# Patient Record
Sex: MALE | Race: OTHER | HISPANIC OR LATINO | URBAN - METROPOLITAN AREA
[De-identification: names, ages, dates, MRNs, and addresses within clinical notes are randomized per-mention and may not be internally consistent; named-entity substitution may affect disease eponyms.]

---

## 2017-04-29 ENCOUNTER — INPATIENT (INPATIENT)
Facility: HOSPITAL | Age: 57
LOS: 4 days | Discharge: HOME | DRG: 897 | End: 2017-05-04
Attending: INTERNAL MEDICINE | Admitting: INTERNAL MEDICINE
Payer: SELF-PAY

## 2017-04-29 VITALS
SYSTOLIC BLOOD PRESSURE: 178 MMHG | WEIGHT: 149.91 LBS | HEIGHT: 67 IN | OXYGEN SATURATION: 96 % | DIASTOLIC BLOOD PRESSURE: 107 MMHG | RESPIRATION RATE: 20 BRPM | HEART RATE: 81 BPM | TEMPERATURE: 99 F

## 2017-04-29 DIAGNOSIS — Z29.9 ENCOUNTER FOR PROPHYLACTIC MEASURES, UNSPECIFIED: ICD-10-CM

## 2017-04-29 DIAGNOSIS — F19.10 OTHER PSYCHOACTIVE SUBSTANCE ABUSE, UNCOMPLICATED: ICD-10-CM
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
## 2017-04-29 DIAGNOSIS — R94.5 ABNORMAL RESULTS OF LIVER FUNCTION STUDIES: ICD-10-CM

## 2017-04-29 DIAGNOSIS — E87.8 OTHER DISORDERS OF ELECTROLYTE AND FLUID BALANCE, NOT ELSEWHERE CLASSIFIED: ICD-10-CM

## 2017-04-29 DIAGNOSIS — F10.239 ALCOHOL DEPENDENCE WITH WITHDRAWAL, UNSPECIFIED: ICD-10-CM

## 2017-04-29 LAB
ACETONE SERPL-MCNC: ABNORMAL
ALBUMIN SERPL ELPH-MCNC: 3.6 G/DL — SIGNIFICANT CHANGE UP (ref 3.5–5)
ALP SERPL-CCNC: 79 U/L — SIGNIFICANT CHANGE UP (ref 40–120)
ALT FLD-CCNC: 181 U/L DA — HIGH (ref 10–60)
AMYLASE P1 CFR SERPL: 149 U/L — HIGH (ref 25–115)
ANION GAP SERPL CALC-SCNC: 15 MMOL/L — SIGNIFICANT CHANGE UP (ref 5–17)
APPEARANCE UR: CLEAR — SIGNIFICANT CHANGE UP
APTT BLD: 26.9 SEC — LOW (ref 27.5–37.4)
AST SERPL-CCNC: 317 U/L — HIGH (ref 10–40)
BASOPHILS # BLD AUTO: 0.1 K/UL — SIGNIFICANT CHANGE UP (ref 0–0.2)
BASOPHILS NFR BLD AUTO: 2.1 % — HIGH (ref 0–2)
BILIRUB SERPL-MCNC: 1 MG/DL — SIGNIFICANT CHANGE UP (ref 0.2–1.2)
BILIRUB UR-MCNC: NEGATIVE — SIGNIFICANT CHANGE UP
BUN SERPL-MCNC: 10 MG/DL — SIGNIFICANT CHANGE UP (ref 7–18)
CALCIUM SERPL-MCNC: 9.5 MG/DL — SIGNIFICANT CHANGE UP (ref 8.4–10.5)
CHLORIDE SERPL-SCNC: 99 MMOL/L — SIGNIFICANT CHANGE UP (ref 96–108)
CK MB BLD-MCNC: 0.3 % — SIGNIFICANT CHANGE UP (ref 0–3.5)
CK MB BLD-MCNC: 0.3 % — SIGNIFICANT CHANGE UP (ref 0–3.5)
CK MB CFR SERPL CALC: 2.9 NG/ML — SIGNIFICANT CHANGE UP (ref 0–3.6)
CK MB CFR SERPL CALC: 3.7 NG/ML — HIGH (ref 0–3.6)
CK SERPL-CCNC: 1331 U/L — HIGH (ref 35–232)
CK SERPL-CCNC: 943 U/L — HIGH (ref 35–232)
CO2 SERPL-SCNC: 25 MMOL/L — SIGNIFICANT CHANGE UP (ref 22–31)
COLOR SPEC: YELLOW — SIGNIFICANT CHANGE UP
CREAT SERPL-MCNC: 0.83 MG/DL — SIGNIFICANT CHANGE UP (ref 0.5–1.3)
DIFF PNL FLD: ABNORMAL
EOSINOPHIL # BLD AUTO: 0 K/UL — SIGNIFICANT CHANGE UP (ref 0–0.5)
EOSINOPHIL NFR BLD AUTO: 0 % — SIGNIFICANT CHANGE UP (ref 0–6)
ETHANOL SERPL-MCNC: <3 MG/DL — SIGNIFICANT CHANGE UP (ref 0–10)
GLUCOSE SERPL-MCNC: 127 MG/DL — HIGH (ref 70–99)
GLUCOSE UR QL: NEGATIVE — SIGNIFICANT CHANGE UP
HCT VFR BLD CALC: 43.7 % — SIGNIFICANT CHANGE UP (ref 39–50)
HGB BLD-MCNC: 14.1 G/DL — SIGNIFICANT CHANGE UP (ref 13–17)
INR BLD: 1.03 RATIO — SIGNIFICANT CHANGE UP (ref 0.88–1.16)
KETONES UR-MCNC: ABNORMAL
LACTATE SERPL-SCNC: 1.6 MMOL/L — SIGNIFICANT CHANGE UP (ref 0.7–2)
LEUKOCYTE ESTERASE UR-ACNC: NEGATIVE — SIGNIFICANT CHANGE UP
LIDOCAIN IGE QN: 403 U/L — HIGH (ref 73–393)
LYMPHOCYTES # BLD AUTO: 0.6 K/UL — LOW (ref 1–3.3)
LYMPHOCYTES # BLD AUTO: 13.3 % — SIGNIFICANT CHANGE UP (ref 13–44)
MAGNESIUM SERPL-MCNC: 2.1 MG/DL — SIGNIFICANT CHANGE UP (ref 1.8–2.4)
MCHC RBC-ENTMCNC: 32 PG — SIGNIFICANT CHANGE UP (ref 27–34)
MCHC RBC-ENTMCNC: 32.2 GM/DL — SIGNIFICANT CHANGE UP (ref 32–36)
MCV RBC AUTO: 99.2 FL — SIGNIFICANT CHANGE UP (ref 80–100)
MONOCYTES # BLD AUTO: 0.6 K/UL — SIGNIFICANT CHANGE UP (ref 0–0.9)
MONOCYTES NFR BLD AUTO: 13.4 % — SIGNIFICANT CHANGE UP (ref 2–14)
NEUTROPHILS # BLD AUTO: 3.3 K/UL — SIGNIFICANT CHANGE UP (ref 1.8–7.4)
NEUTROPHILS NFR BLD AUTO: 71.2 % — SIGNIFICANT CHANGE UP (ref 43–77)
NITRITE UR-MCNC: NEGATIVE — SIGNIFICANT CHANGE UP
NT-PROBNP SERPL-SCNC: 209 PG/ML — HIGH (ref 0–125)
PCP SPEC-MCNC: SIGNIFICANT CHANGE UP
PH UR: 7 — SIGNIFICANT CHANGE UP (ref 5–8)
PLATELET # BLD AUTO: 100 K/UL — LOW (ref 150–400)
POTASSIUM SERPL-MCNC: 3.9 MMOL/L — SIGNIFICANT CHANGE UP (ref 3.5–5.3)
POTASSIUM SERPL-SCNC: 3.9 MMOL/L — SIGNIFICANT CHANGE UP (ref 3.5–5.3)
PROT SERPL-MCNC: 8.3 G/DL — SIGNIFICANT CHANGE UP (ref 6–8.3)
PROT UR-MCNC: 30 MG/DL
PROTHROM AB SERPL-ACNC: 11.2 SEC — SIGNIFICANT CHANGE UP (ref 9.8–12.7)
RBC # BLD: 4.4 M/UL — SIGNIFICANT CHANGE UP (ref 4.2–5.8)
RBC # FLD: 14.2 % — SIGNIFICANT CHANGE UP (ref 10.3–14.5)
SODIUM SERPL-SCNC: 139 MMOL/L — SIGNIFICANT CHANGE UP (ref 135–145)
SP GR SPEC: 1.01 — SIGNIFICANT CHANGE UP (ref 1.01–1.02)
TROPONIN I SERPL-MCNC: 0.02 NG/ML — SIGNIFICANT CHANGE UP (ref 0–0.04)
TROPONIN I SERPL-MCNC: 0.03 NG/ML — SIGNIFICANT CHANGE UP (ref 0–0.04)
UROBILINOGEN FLD QL: 1
WBC # BLD: 4.6 K/UL — SIGNIFICANT CHANGE UP (ref 3.8–10.5)
WBC # FLD AUTO: 4.6 K/UL — SIGNIFICANT CHANGE UP (ref 3.8–10.5)

## 2017-04-29 PROCEDURE — 72100 X-RAY EXAM L-S SPINE 2/3 VWS: CPT | Mod: 26

## 2017-04-29 PROCEDURE — 71010: CPT | Mod: 26

## 2017-04-29 PROCEDURE — 99291 CRITICAL CARE FIRST HOUR: CPT

## 2017-04-29 RX ORDER — ONDANSETRON 8 MG/1
4 TABLET, FILM COATED ORAL ONCE
Qty: 0 | Refills: 0 | Status: COMPLETED | OUTPATIENT
Start: 2017-04-29 | End: 2017-04-29

## 2017-04-29 RX ORDER — PANTOPRAZOLE SODIUM 20 MG/1
40 TABLET, DELAYED RELEASE ORAL
Qty: 0 | Refills: 0 | Status: DISCONTINUED | OUTPATIENT
Start: 2017-04-29 | End: 2017-05-04

## 2017-04-29 RX ORDER — PANTOPRAZOLE SODIUM 20 MG/1
40 TABLET, DELAYED RELEASE ORAL ONCE
Qty: 0 | Refills: 0 | Status: COMPLETED | OUTPATIENT
Start: 2017-04-29 | End: 2017-04-29

## 2017-04-29 RX ORDER — ACETAMINOPHEN 500 MG
650 TABLET ORAL EVERY 6 HOURS
Qty: 0 | Refills: 0 | Status: DISCONTINUED | OUTPATIENT
Start: 2017-04-29 | End: 2017-05-04

## 2017-04-29 RX ORDER — ENOXAPARIN SODIUM 100 MG/ML
40 INJECTION SUBCUTANEOUS DAILY
Qty: 0 | Refills: 0 | Status: DISCONTINUED | OUTPATIENT
Start: 2017-04-29 | End: 2017-05-04

## 2017-04-29 RX ORDER — SODIUM CHLORIDE 9 MG/ML
1000 INJECTION INTRAMUSCULAR; INTRAVENOUS; SUBCUTANEOUS
Qty: 0 | Refills: 0 | Status: DISCONTINUED | OUTPATIENT
Start: 2017-04-29 | End: 2017-04-29

## 2017-04-29 RX ORDER — POTASSIUM PHOSPHATE, MONOBASIC POTASSIUM PHOSPHATE, DIBASIC 236; 224 MG/ML; MG/ML
15 INJECTION, SOLUTION INTRAVENOUS ONCE
Qty: 0 | Refills: 0 | Status: COMPLETED | OUTPATIENT
Start: 2017-04-29 | End: 2017-04-29

## 2017-04-29 RX ORDER — SODIUM CHLORIDE 9 MG/ML
1000 INJECTION, SOLUTION INTRAVENOUS
Qty: 0 | Refills: 0 | Status: DISCONTINUED | OUTPATIENT
Start: 2017-04-29 | End: 2017-05-03

## 2017-04-29 RX ORDER — SODIUM CHLORIDE 9 MG/ML
1000 INJECTION INTRAMUSCULAR; INTRAVENOUS; SUBCUTANEOUS ONCE
Qty: 0 | Refills: 0 | Status: COMPLETED | OUTPATIENT
Start: 2017-04-29 | End: 2017-04-29

## 2017-04-29 RX ORDER — ASPIRIN/CALCIUM CARB/MAGNESIUM 324 MG
162 TABLET ORAL ONCE
Qty: 0 | Refills: 0 | Status: COMPLETED | OUTPATIENT
Start: 2017-04-29 | End: 2017-04-29

## 2017-04-29 RX ORDER — ACETAMINOPHEN 500 MG
650 TABLET ORAL ONCE
Qty: 0 | Refills: 0 | Status: COMPLETED | OUTPATIENT
Start: 2017-04-29 | End: 2017-04-29

## 2017-04-29 RX ORDER — ONDANSETRON 8 MG/1
4 TABLET, FILM COATED ORAL EVERY 4 HOURS
Qty: 0 | Refills: 0 | Status: DISCONTINUED | OUTPATIENT
Start: 2017-04-29 | End: 2017-05-04

## 2017-04-29 RX ADMIN — Medication 2 MILLIGRAM(S): at 20:13

## 2017-04-29 RX ADMIN — Medication 162 MILLIGRAM(S): at 10:32

## 2017-04-29 RX ADMIN — ENOXAPARIN SODIUM 40 MILLIGRAM(S): 100 INJECTION SUBCUTANEOUS at 14:58

## 2017-04-29 RX ADMIN — SODIUM CHLORIDE 200 MILLILITER(S): 9 INJECTION INTRAMUSCULAR; INTRAVENOUS; SUBCUTANEOUS at 08:30

## 2017-04-29 RX ADMIN — SODIUM CHLORIDE 2000 MILLILITER(S): 9 INJECTION INTRAMUSCULAR; INTRAVENOUS; SUBCUTANEOUS at 10:33

## 2017-04-29 RX ADMIN — Medication 650 MILLIGRAM(S): at 17:23

## 2017-04-29 RX ADMIN — Medication 650 MILLIGRAM(S): at 17:55

## 2017-04-29 RX ADMIN — POTASSIUM PHOSPHATE, MONOBASIC POTASSIUM PHOSPHATE, DIBASIC 62.5 MILLIMOLE(S): 236; 224 INJECTION, SOLUTION INTRAVENOUS at 14:52

## 2017-04-29 RX ADMIN — Medication 2 MILLIGRAM(S): at 23:46

## 2017-04-29 RX ADMIN — ONDANSETRON 4 MILLIGRAM(S): 8 TABLET, FILM COATED ORAL at 08:30

## 2017-04-29 RX ADMIN — Medication 2 MILLIGRAM(S): at 08:30

## 2017-04-29 RX ADMIN — PANTOPRAZOLE SODIUM 40 MILLIGRAM(S): 20 TABLET, DELAYED RELEASE ORAL at 08:30

## 2017-04-29 RX ADMIN — SODIUM CHLORIDE 125 MILLILITER(S): 9 INJECTION, SOLUTION INTRAVENOUS at 14:48

## 2017-04-29 RX ADMIN — Medication 650 MILLIGRAM(S): at 10:32

## 2017-04-29 NOTE — ED PROVIDER NOTE - OBJECTIVE STATEMENT
56 y.o. male BIBA pt claims "I stopped drinking 2 days ago", last drank Wed. am, pt claims just wants to stop, c/o vomiting since yest, numerous times, watery stool numerous times, no BRBPR, shaking, unable to sleep, visual/auditory hallucination.  no abd pain, pt fell 2x yest on his back, no head injury, no LOC, c/o back pain

## 2017-04-29 NOTE — ED ADULT TRIAGE NOTE - CHIEF COMPLAINT QUOTE
AS per EMS, Alcohol withdrawal. Noted with марина hand tremors and vomiting. Last drink x 2 days ago.

## 2017-04-29 NOTE — ED PROVIDER NOTE - PROGRESS NOTE DETAILS
pt with significant dehydration, alcohol withdraw, will admit pt with significant dehydration, alcohol withdraw, will admit, d/w Dr. Gar

## 2017-04-29 NOTE — H&P ADULT. - PROBLEM SELECTOR PLAN 6
Improved VTE score of 0, with 3 month risk of VTE is 0.4, but since patient will be in bed all the time , will give Lovenox for now , can dc when start ambulating   Protonix for GI prophylaxis.

## 2017-04-29 NOTE — ED PROVIDER NOTE - CRITICAL CARE PROVIDED
documentation/additional history taking/consultation with other physicians/interpretation of diagnostic studies/direct patient care (not related to procedure)

## 2017-04-29 NOTE — H&P ADULT. - NEGATIVE NEUROLOGICAL SYMPTOMS
no vertigo/no hemiparesis/no facial palsy/no loss of sensation/no transient paralysis/no confusion/no difficulty walking/no syncope/no focal seizures/no weakness

## 2017-04-29 NOTE — ED PROVIDER NOTE - MUSCULOSKELETAL, MLM
Spine appears normal, range of motion is not limited, no muscle or joint tenderness, thoracic, lumbar spine sl tenderness to palp, straight leg-90 deg, no CVAT

## 2017-04-29 NOTE — H&P ADULT. - NEGATIVE MUSCULOSKELETAL SYMPTOMS
no arthralgia/no muscle cramps/no joint swelling/no leg pain L/no arthritis/no muscle weakness/no back pain/no myalgia/no stiffness/no arm pain R/no leg pain R/no arm pain L/no neck pain

## 2017-04-29 NOTE — H&P ADULT. - PROBLEM SELECTOR PLAN 4
Weekly smoke marijuana, last intake was on Wednesday   UTox positive for benzo , no marijuana  Social work consult

## 2017-04-29 NOTE — H&P ADULT. - ASSESSMENT
57 Y/O M, alcoholic , no PMHx , presented with nausea, vomiting, diarrhea , dizziness x 2 -3 days, admitted for alcohol withdrawal syndrome, with last drink on Wednesday     In ED patient vital signs 99.4, later 100, /107, which came down to 140/89 w/o any intervention, sick looking appearance loos dehydrated. Got aspirin 162 once, 1 L NS bolus, tylenol, 2 mg of ativan, Protonix , zofran . Labs shows lactate of 1.6, , , direct Bl 0.4, blood alcohol level is <3, CK 1331, PO4 2.4, CXR clear, Xray lumbar spine negative for any fracture. Admitted to tele for drowsy , dizziness.

## 2017-04-29 NOTE — H&P ADULT. - HISTORY OF PRESENT ILLNESS
57 Y/O , M, homeless, walks without assistance, alcoholic, smoker, drug abuse, no pertinent PMHx, takes no medications at home, presented with nausea, vomiting, diarrhea , dizziness x 2 -3 days. Patient was not able to speak properly , very drowsy . As per him , he stopped drinking alcohol on Wednesday after that he started having all this symptoms. He used to drink about 1 bottle of liquor everyday. He was admitted to [facility] about 6 months ago due to alcohol withdrawal and similar complains   He also said that he wanted to quit , he tried quitting in past, following the program [facility] ( [facility]) , but whenever he stopped he started having the above symptoms. He reported of having 5-6 episodes of yellowish fluid , w/o blood vomiting 12 episodes of clear watery diarrhea, dizziness, unable to eat anything. he also reported of having hallucinations mostly visual. On Wednesday he fell due to dizziness, hit his back , but no LOC, no head hitting. since then he is having back pain. Also complain of heart burn , whenever he vomits. Reports mild headache. Today he went to that program for help , they called  ambulance and send him here. Denies any chest pain , abdominal pain, SOB, cough, urinary complains.     SHx: Current every day smoker of about 2-3 cigarettes / day for last 10 year. Weekly smoke marijuana last intake was on Wednesday Current every day alcoholic drinker. Upon counselling he said that he wanted to quit and have been trying in past. He is homeless.

## 2017-04-29 NOTE — H&P ADULT. - NEGATIVE CARDIOVASCULAR SYMPTOMS
no chest pain/no peripheral edema/no palpitations/no orthopnea/no claudication/no dyspnea on exertion/no paroxysmal nocturnal dyspnea

## 2017-04-29 NOTE — H&P ADULT. - PROBLEM SELECTOR PLAN 1
Alcohol withdrawl with hallucinations   Current every day drinker of 1 bottle of liquor, presented with nausea, vomiting, dizziness, diarrhea , hallucination , tremulousness   Last drink on Wednesday   On physical exam looks drowsy , dehydrated, tremors   CAGE -4   Vitals signs 99.4, later 100, /107, which came down to 140/89 w/o any intervention,  AST /ALT ratio > 2 ( 317/181)   Got aspirin 162 once, 1 L NS bolus, tylenols, 2 mg of ativan, protonix , zofran '  blood alcohol level is <3,   Continue with banana bag x days   Ativan 2mg q 2 PRN , 1 mg Q1 PRN   CIWA-A protocol   NPO for now as patient having vomiting , advance the diet as tolerated   Social work consult   Monitor on tele x 24 hours  UA shows protein of 30, moderate ketones in serum Alcohol withdrawal with hallucinations   Current every day drinker of 1 bottle of liquor, presented with nausea, vomiting, dizziness, diarrhea , hallucination , tremulousness   Last drink on Wednesday   On physical exam looks drowsy , dehydrated, tremors   CAGE -4   Vitals signs 99.4, later 100, /107, which came down to 140/89 w/o any intervention,  AST /ALT ratio > 2 ( 317/181)   Got aspirin 162 once, 1 L NS bolus, tylenols, 2 mg of ativan, protonix , zofran '  blood alcohol level is <3,   Continue with banana bag x days   Ativan 2mg q 2 PRN ,  2 mg Q4 standing   CIWA-A protocol   NPO for now as patient having vomiting , advance the diet as tolerated   Social work consult   Monitor on tele x 24 hours  UA shows protein of 30, moderate ketones in serum  Psych consult if needed

## 2017-04-29 NOTE — H&P ADULT. - GASTROINTESTINAL DETAILS
nontender/no guarding/normal/no organomegaly/no rebound tenderness/bowel sounds normal/no masses palpable/soft/no rigidity/no bruit/no distention

## 2017-04-29 NOTE — H&P ADULT. - NEGATIVE ENMT SYMPTOMS
no throat pain/no abnormal taste sensation/no dysphagia/no post-nasal discharge/no recurrent cold sores/no tinnitus/no hearing difficulty/no vertigo/no gum bleeding/no dry mouth/no sinus symptoms/no nasal congestion/no ear pain/no nasal obstruction/no nose bleeds

## 2017-04-29 NOTE — H&P ADULT. - NEGATIVE OPHTHALMOLOGIC SYMPTOMS
no pain L/no lacrimation L/no loss of vision R/no irritation R/no discharge R/no scleral injection L/no scleral injection R/no discharge L/no blurred vision R/no diplopia/no pain R/no loss of vision L/no lacrimation R/no blurred vision L/no irritation L/no photophobia

## 2017-04-29 NOTE — ED PROVIDER NOTE - ENMT, MLM
Airway patent, Nasal mucosa clear. Mouth with normal mucosa. Throat has no vesicles, no oropharyngeal exudates and uvula is midline. tongue fasciculation

## 2017-04-29 NOTE — H&P ADULT. - PROBLEM SELECTOR PLAN 2
AST /ALT ratio > 2 ( 317/181)   Direct biluribin 0.4  Amylase 149  , lipase  403  Continue to trend LFts   If does not improve consider the U/S abdomen then AST /ALT ratio > 2 ( 317/181)   Direct bilirubin 0.4  Amylase 149  , lipase  403  Continue to trend LFts   If does not improve consider the U/S abdomen then

## 2017-04-29 NOTE — H&P ADULT. - PROBLEM SELECTOR PLAN 3
Fall on wednesday likely due to dizziness , no LOC, no head trauma    Xray lumbar spine negative for any fracture  CK elevated to 1331   Continue with IVF , monitor the CK levels   Fall precautions Fall on wednesday likely due to dizziness , no LOC, no head trauma    Xray lumbar spine negative for any fracture  CK elevated to 1331   Continue with IVF , monitor the CK levels   Fall precautions  F/up on CT scan ( no focal defects)

## 2017-04-29 NOTE — H&P ADULT. - RS GEN PE MLT RESP DETAILS PC
clear to auscultation bilaterally/breath sounds equal/no rales/no wheezes/normal/no chest wall tenderness/no subcutaneous emphysema/no rhonchi/airway patent/good air movement/no intercostal retractions/respirations non-labored

## 2017-04-29 NOTE — H&P ADULT. - NEGATIVE GENERAL SYMPTOMS
no polyuria/no weight gain/no polyphagia/no polydipsia/no weight loss/no fatigue/no fever/no sweating/no anorexia/no chills

## 2017-04-29 NOTE — H&P ADULT. - MUSCULOSKELETAL
details… detailed exam no joint erythema/normal/no calf tenderness/ROM intact/normal strength/no joint swelling

## 2017-04-30 LAB
ALBUMIN SERPL ELPH-MCNC: 3.5 G/DL — SIGNIFICANT CHANGE UP (ref 3.5–5)
ALP SERPL-CCNC: 70 U/L — SIGNIFICANT CHANGE UP (ref 40–120)
ALT FLD-CCNC: 133 U/L DA — HIGH (ref 10–60)
ANION GAP SERPL CALC-SCNC: 13 MMOL/L — SIGNIFICANT CHANGE UP (ref 5–17)
AST SERPL-CCNC: 178 U/L — HIGH (ref 10–40)
BASOPHILS # BLD AUTO: 0.1 K/UL — SIGNIFICANT CHANGE UP (ref 0–0.2)
BASOPHILS NFR BLD AUTO: 1.7 % — SIGNIFICANT CHANGE UP (ref 0–2)
BILIRUB SERPL-MCNC: 1 MG/DL — SIGNIFICANT CHANGE UP (ref 0.2–1.2)
BUN SERPL-MCNC: 8 MG/DL — SIGNIFICANT CHANGE UP (ref 7–18)
CALCIUM SERPL-MCNC: 9.2 MG/DL — SIGNIFICANT CHANGE UP (ref 8.4–10.5)
CHLORIDE SERPL-SCNC: 97 MMOL/L — SIGNIFICANT CHANGE UP (ref 96–108)
CHOLEST SERPL-MCNC: 248 MG/DL — HIGH (ref 10–199)
CO2 SERPL-SCNC: 26 MMOL/L — SIGNIFICANT CHANGE UP (ref 22–31)
CREAT SERPL-MCNC: 0.7 MG/DL — SIGNIFICANT CHANGE UP (ref 0.5–1.3)
EOSINOPHIL # BLD AUTO: 0.1 K/UL — SIGNIFICANT CHANGE UP (ref 0–0.5)
EOSINOPHIL NFR BLD AUTO: 2.2 % — SIGNIFICANT CHANGE UP (ref 0–6)
FOLATE SERPL-MCNC: 17.7 NG/ML — SIGNIFICANT CHANGE UP (ref 4.8–24.2)
GLUCOSE SERPL-MCNC: 98 MG/DL — SIGNIFICANT CHANGE UP (ref 70–99)
HBA1C BLD-MCNC: 6.2 % — HIGH (ref 4–5.6)
HCT VFR BLD CALC: 42.4 % — SIGNIFICANT CHANGE UP (ref 39–50)
HDLC SERPL-MCNC: 146 MG/DL — HIGH (ref 40–125)
HGB BLD-MCNC: 14.2 G/DL — SIGNIFICANT CHANGE UP (ref 13–17)
LIPID PNL WITH DIRECT LDL SERPL: 68 MG/DL — SIGNIFICANT CHANGE UP
LYMPHOCYTES # BLD AUTO: 1.3 K/UL — SIGNIFICANT CHANGE UP (ref 1–3.3)
LYMPHOCYTES # BLD AUTO: 23.7 % — SIGNIFICANT CHANGE UP (ref 13–44)
MAGNESIUM SERPL-MCNC: 1.9 MG/DL — SIGNIFICANT CHANGE UP (ref 1.8–2.4)
MCHC RBC-ENTMCNC: 33 PG — SIGNIFICANT CHANGE UP (ref 27–34)
MCHC RBC-ENTMCNC: 33.5 GM/DL — SIGNIFICANT CHANGE UP (ref 32–36)
MCV RBC AUTO: 98.5 FL — SIGNIFICANT CHANGE UP (ref 80–100)
MONOCYTES # BLD AUTO: 0.8 K/UL — SIGNIFICANT CHANGE UP (ref 0–0.9)
MONOCYTES NFR BLD AUTO: 14.1 % — HIGH (ref 2–14)
NEUTROPHILS # BLD AUTO: 3.1 K/UL — SIGNIFICANT CHANGE UP (ref 1.8–7.4)
NEUTROPHILS NFR BLD AUTO: 58.4 % — SIGNIFICANT CHANGE UP (ref 43–77)
PHOSPHATE SERPL-MCNC: 2 MG/DL — LOW (ref 2.5–4.5)
PLATELET # BLD AUTO: 104 K/UL — LOW (ref 150–400)
POTASSIUM SERPL-MCNC: 3.4 MMOL/L — LOW (ref 3.5–5.3)
POTASSIUM SERPL-SCNC: 3.4 MMOL/L — LOW (ref 3.5–5.3)
PROT SERPL-MCNC: 8 G/DL — SIGNIFICANT CHANGE UP (ref 6–8.3)
RBC # BLD: 4.31 M/UL — SIGNIFICANT CHANGE UP (ref 4.2–5.8)
RBC # FLD: 14 % — SIGNIFICANT CHANGE UP (ref 10.3–14.5)
SODIUM SERPL-SCNC: 136 MMOL/L — SIGNIFICANT CHANGE UP (ref 135–145)
TOTAL CHOLESTEROL/HDL RATIO MEASUREMENT: 1.7 RATIO — LOW (ref 3.4–9.6)
TRIGL SERPL-MCNC: 172 MG/DL — HIGH (ref 10–149)
TSH SERPL-MCNC: 2.65 UU/ML — SIGNIFICANT CHANGE UP (ref 0.34–4.82)
VIT B12 SERPL-MCNC: 1497 PG/ML — HIGH (ref 243–894)
WBC # BLD: 5.3 K/UL — SIGNIFICANT CHANGE UP (ref 3.8–10.5)
WBC # FLD AUTO: 5.3 K/UL — SIGNIFICANT CHANGE UP (ref 3.8–10.5)

## 2017-04-30 RX ORDER — POTASSIUM CHLORIDE 20 MEQ
40 PACKET (EA) ORAL ONCE
Qty: 0 | Refills: 0 | Status: COMPLETED | OUTPATIENT
Start: 2017-04-30 | End: 2017-04-30

## 2017-04-30 RX ORDER — DEXTROSE MONOHYDRATE, SODIUM CHLORIDE, AND POTASSIUM CHLORIDE 50; .745; 4.5 G/1000ML; G/1000ML; G/1000ML
1000 INJECTION, SOLUTION INTRAVENOUS
Qty: 0 | Refills: 0 | Status: DISCONTINUED | OUTPATIENT
Start: 2017-04-30 | End: 2017-05-02

## 2017-04-30 RX ORDER — SODIUM,POTASSIUM PHOSPHATES 278-250MG
1 POWDER IN PACKET (EA) ORAL
Qty: 0 | Refills: 0 | Status: COMPLETED | OUTPATIENT
Start: 2017-04-30 | End: 2017-04-30

## 2017-04-30 RX ADMIN — Medication 2 MILLIGRAM(S): at 07:02

## 2017-04-30 RX ADMIN — Medication 2 MILLIGRAM(S): at 15:00

## 2017-04-30 RX ADMIN — Medication 1 TABLET(S): at 11:42

## 2017-04-30 RX ADMIN — PANTOPRAZOLE SODIUM 40 MILLIGRAM(S): 20 TABLET, DELAYED RELEASE ORAL at 05:52

## 2017-04-30 RX ADMIN — Medication 2 MILLIGRAM(S): at 05:53

## 2017-04-30 RX ADMIN — DEXTROSE MONOHYDRATE, SODIUM CHLORIDE, AND POTASSIUM CHLORIDE 100 MILLILITER(S): 50; .745; 4.5 INJECTION, SOLUTION INTRAVENOUS at 12:27

## 2017-04-30 RX ADMIN — ENOXAPARIN SODIUM 40 MILLIGRAM(S): 100 INJECTION SUBCUTANEOUS at 11:41

## 2017-04-30 RX ADMIN — Medication 1 TABLET(S): at 17:30

## 2017-04-30 RX ADMIN — Medication 40 MILLIEQUIVALENT(S): at 11:41

## 2017-04-30 RX ADMIN — Medication 1 TABLET(S): at 21:48

## 2017-04-30 RX ADMIN — Medication 2 MILLIGRAM(S): at 11:40

## 2017-04-30 RX ADMIN — SODIUM CHLORIDE 125 MILLILITER(S): 9 INJECTION, SOLUTION INTRAVENOUS at 15:01

## 2017-04-30 RX ADMIN — Medication 2 MILLIGRAM(S): at 17:30

## 2017-04-30 RX ADMIN — Medication 2 MILLIGRAM(S): at 02:43

## 2017-04-30 RX ADMIN — Medication 2 MILLIGRAM(S): at 21:49

## 2017-05-01 PROCEDURE — 72040 X-RAY EXAM NECK SPINE 2-3 VW: CPT | Mod: 26

## 2017-05-01 PROCEDURE — 70450 CT HEAD/BRAIN W/O DYE: CPT | Mod: 26

## 2017-05-01 RX ORDER — ZALEPLON 10 MG
5 CAPSULE ORAL ONCE
Qty: 0 | Refills: 0 | Status: DISCONTINUED | OUTPATIENT
Start: 2017-05-01 | End: 2017-05-01

## 2017-05-01 RX ORDER — VANCOMYCIN HCL 1 G
1000 VIAL (EA) INTRAVENOUS ONCE
Qty: 0 | Refills: 0 | Status: COMPLETED | OUTPATIENT
Start: 2017-05-01 | End: 2017-05-01

## 2017-05-01 RX ADMIN — Medication 2 MILLIGRAM(S): at 10:42

## 2017-05-01 RX ADMIN — Medication 2 MILLIGRAM(S): at 01:53

## 2017-05-01 RX ADMIN — Medication 650 MILLIGRAM(S): at 20:49

## 2017-05-01 RX ADMIN — Medication 250 MILLIGRAM(S): at 14:18

## 2017-05-01 RX ADMIN — SODIUM CHLORIDE 125 MILLILITER(S): 9 INJECTION, SOLUTION INTRAVENOUS at 17:45

## 2017-05-01 RX ADMIN — Medication 2 MILLIGRAM(S): at 17:45

## 2017-05-01 RX ADMIN — Medication 5 MILLIGRAM(S): at 21:43

## 2017-05-01 RX ADMIN — Medication 2 MILLIGRAM(S): at 06:16

## 2017-05-01 RX ADMIN — Medication 650 MILLIGRAM(S): at 21:45

## 2017-05-01 RX ADMIN — PANTOPRAZOLE SODIUM 40 MILLIGRAM(S): 20 TABLET, DELAYED RELEASE ORAL at 06:16

## 2017-05-01 RX ADMIN — ENOXAPARIN SODIUM 40 MILLIGRAM(S): 100 INJECTION SUBCUTANEOUS at 11:37

## 2017-05-01 NOTE — DISCHARGE NOTE ADULT - HOSPITAL COURSE
55 Y/O , M, homeless, walks without assistance, alcoholic, smoker, drug abuse, no pertinent PMHx, takes no medications at home, presented with nausea, vomiting, diarrhea , dizziness x 2 -3 days. Patient was not able to speak properly , very drowsy . As per him , he stopped drinking alcohol on Wednesday after that he started having all this symptoms. He used to drink about 1 bottle of liquor everyday. He was admitted to [facility] about 6 months ago due to alcohol withdrawal and similar complains   He also said that he wanted to quit , he tried quitting in past, following the program [facility] ( [facility]) , but whenever he stopped he started having the above symptoms. He reported of having 5-6 episodes of yellowish fluid , w/o blood vomiting 12 episodes of clear watery diarrhea, dizziness, unable to eat anything. he also reported of having hallucinations mostly visual. On Wednesday he fell due to dizziness, hit his back , but no LOC, no head hitting. since then he is having back pain. Also complain of heart burn , whenever he vomits. Reports mild headache. Today he went to that program for help , they called  ambulance and send him here. Denies any chest pain , abdominal pain, SOB, cough, urinary complains.     SHx: Current every day smoker of about 2-3 cigarettes / day for last 10 year. Weekly smoke marijuana last intake was on Wednesday Current every day alcoholic drinker. Upon counselling he said that he wanted to quit and have been trying in past. He is homeless.     Patient was admitted to telemetry and was started on banana bag. He was started on CIWA protocol and ativan was titrated based on score each day. Ativan was tapered and patient's symptoms improved. Hepatitis panel was positive for hepatitis C. Patient will need outpatient GI follow-up. For his history of depression patient will also need to follow-up with a psychiatrist.     Per attending patient is medically stable for discharge. Patient will need to take .5mg ativan bid tomorrow (5/5) and .5mg once on 5/6. Patient should follow-up with a PCP, GI, and psychiatrist.

## 2017-05-01 NOTE — DISCHARGE NOTE ADULT - PLAN OF CARE
alcohol cessation treatment You were found to have hepatitis C. You will need to follow-up with your primary care doctor and a GI (gastrointestinal/ stomach doctor). to control symptoms of depression You will need to follow-up with a psychiatrist for your history of depression. You were treated for alcohol withdrawal. You will need to take ativan .5mg twice tomorrow (5/5) and .5mg once on 5/6. Take vitamins as prescribed. you were seen by the . Follow-up with alcohol cessation resources such as alcoholics anonymous. You were treated for alcohol withdrawal. You will need to take ativan .5mg twice tomorrow (5/5) and .5mg once on 5/6. Take vitamins as prescribed. you were seen by the . Follow-up with alcohol cessation resources such as alcoholics anonymous and the alcohol cessation program you were following with prior to hospitalization. You were found to have hepatitis C. You will need to follow-up with your primary care doctor and a GI (gastrointestinal/ stomach doctor). You should follow-up with [facility] where you have been seen previously. You will need to follow-up with a psychiatrist for your history of depression. You should follow-up with [facility] where you have been seen previously. You were treated for alcohol withdrawal. You will need to take ativan .5mg twice tomorrow (5/5) and .5mg once on 5/6. Take vitamins as prescribed. you were seen by the . Follow-up with alcohol cessation resources such as alcoholics anonymous and the alcohol cessation program you were following with prior to hospitalization. You should follow-up with [facility] where you have been seen previously.

## 2017-05-01 NOTE — DISCHARGE NOTE ADULT - CARE PLAN
Principal Discharge DX:	Alcohol withdrawal  Goal:	alcohol cessation  Instructions for follow-up, activity and diet:	You were treated for alcohol withdrawal. You will need to take ativan .5mg twice tomorrow (5/5) and .5mg once on 5/6. Take vitamins as prescribed. you were seen by the . Follow-up with alcohol cessation resources such as alcoholics anonymous.  Secondary Diagnosis:	Hepatitis C  Goal:	treatment  Instructions for follow-up, activity and diet:	You were found to have hepatitis C. You will need to follow-up with your primary care doctor and a GI (gastrointestinal/ stomach doctor).  Secondary Diagnosis:	Depression  Goal:	to control symptoms of depression  Instructions for follow-up, activity and diet:	You will need to follow-up with a psychiatrist for your history of depression. Principal Discharge DX:	Alcohol withdrawal  Goal:	alcohol cessation  Instructions for follow-up, activity and diet:	You were treated for alcohol withdrawal. You will need to take ativan .5mg twice tomorrow (5/5) and .5mg once on 5/6. Take vitamins as prescribed. you were seen by the . Follow-up with alcohol cessation resources such as alcoholics anonymous and the alcohol cessation program you were following with prior to hospitalization.  Secondary Diagnosis:	Hepatitis C  Goal:	treatment  Instructions for follow-up, activity and diet:	You were found to have hepatitis C. You will need to follow-up with your primary care doctor and a GI (gastrointestinal/ stomach doctor).  Secondary Diagnosis:	Depression  Goal:	to control symptoms of depression  Instructions for follow-up, activity and diet:	You will need to follow-up with a psychiatrist for your history of depression. Principal Discharge DX:	Alcohol withdrawal  Goal:	alcohol cessation  Instructions for follow-up, activity and diet:	You were treated for alcohol withdrawal. You will need to take ativan .5mg twice tomorrow (5/5) and .5mg once on 5/6. Take vitamins as prescribed. you were seen by the . Follow-up with alcohol cessation resources such as alcoholics anonymous and the alcohol cessation program you were following with prior to hospitalization. You should follow-up with [facility] where you have been seen previously.  Secondary Diagnosis:	Hepatitis C  Goal:	treatment  Instructions for follow-up, activity and diet:	You were found to have hepatitis C. You will need to follow-up with your primary care doctor and a GI (gastrointestinal/ stomach doctor). You should follow-up with [facility] where you have been seen previously.  Secondary Diagnosis:	Depression  Goal:	to control symptoms of depression  Instructions for follow-up, activity and diet:	You will need to follow-up with a psychiatrist for your history of depression. You should follow-up with [facility] where you have been seen previously. Principal Discharge DX:	Alcohol withdrawal  Goal:	alcohol cessation  Instructions for follow-up, activity and diet:	You were treated for alcohol withdrawal. You will need to take ativan .5mg twice tomorrow (5/5) and .5mg once on 5/6. Take vitamins as prescribed. you were seen by the . Follow-up with alcohol cessation resources such as alcoholics anonymous and the alcohol cessation program you were following with prior to hospitalization. You should follow-up with [facility] where you have been seen previously.  Secondary Diagnosis:	Hepatitis C  Goal:	treatment  Instructions for follow-up, activity and diet:	You were found to have hepatitis C. You will need to follow-up with your primary care doctor and a GI (gastrointestinal/ stomach doctor). You should follow-up with [facility] where you have been seen previously.  Secondary Diagnosis:	Depression  Goal:	to control symptoms of depression  Instructions for follow-up, activity and diet:	You will need to follow-up with a psychiatrist for your history of depression. You should follow-up with [facility] where you have been seen previously.

## 2017-05-01 NOTE — DISCHARGE NOTE ADULT - NS AS DC STROKE ED MATERIALS
Call 911 for Stroke/Stroke Warning Signs and Symptoms/Prescribed Medications/Risk Factors for Stroke/Stroke Education Booklet/Need for Followup After Discharge

## 2017-05-01 NOTE — DISCHARGE NOTE ADULT - MEDICATION SUMMARY - MEDICATIONS TO TAKE
I will START or STAY ON the medications listed below when I get home from the hospital:    LORazepam 0.5 mg oral tablet  -- 1 tab(s) by mouth 2 times a day on 5/5 and 1 tab on 5/6 MDD:do not excess more than 2 tabs in 1 day  -- Indication: For Alcohol dependence with withdrawal    Multiple Vitamins oral tablet  -- 1 tab(s) by mouth once a day  -- Indication: For vitamin supplement    folic acid 1 mg oral tablet  -- 1 tab(s) by mouth once a day  -- Indication: For vitamin supplement    thiamine 100 mg oral tablet  -- 1 tab(s) by mouth once a day  -- Indication: For vitamin supplement

## 2017-05-02 LAB
ANION GAP SERPL CALC-SCNC: 11 MMOL/L — SIGNIFICANT CHANGE UP (ref 5–17)
ANION GAP SERPL CALC-SCNC: 9 MMOL/L — SIGNIFICANT CHANGE UP (ref 5–17)
BUN SERPL-MCNC: 6 MG/DL — LOW (ref 7–18)
BUN SERPL-MCNC: 9 MG/DL — SIGNIFICANT CHANGE UP (ref 7–18)
CALCIUM SERPL-MCNC: 8.8 MG/DL — SIGNIFICANT CHANGE UP (ref 8.4–10.5)
CALCIUM SERPL-MCNC: 9 MG/DL — SIGNIFICANT CHANGE UP (ref 8.4–10.5)
CHLORIDE SERPL-SCNC: 104 MMOL/L — SIGNIFICANT CHANGE UP (ref 96–108)
CHLORIDE SERPL-SCNC: 106 MMOL/L — SIGNIFICANT CHANGE UP (ref 96–108)
CO2 SERPL-SCNC: 25 MMOL/L — SIGNIFICANT CHANGE UP (ref 22–31)
CO2 SERPL-SCNC: 26 MMOL/L — SIGNIFICANT CHANGE UP (ref 22–31)
CREAT SERPL-MCNC: 0.68 MG/DL — SIGNIFICANT CHANGE UP (ref 0.5–1.3)
CREAT SERPL-MCNC: 0.94 MG/DL — SIGNIFICANT CHANGE UP (ref 0.5–1.3)
GLUCOSE SERPL-MCNC: 112 MG/DL — HIGH (ref 70–99)
GLUCOSE SERPL-MCNC: 94 MG/DL — SIGNIFICANT CHANGE UP (ref 70–99)
MAGNESIUM SERPL-MCNC: 2.1 MG/DL — SIGNIFICANT CHANGE UP (ref 1.8–2.4)
PHOSPHATE SERPL-MCNC: 2.9 MG/DL — SIGNIFICANT CHANGE UP (ref 2.5–4.5)
POTASSIUM SERPL-MCNC: 3.2 MMOL/L — LOW (ref 3.5–5.3)
POTASSIUM SERPL-MCNC: 4.1 MMOL/L — SIGNIFICANT CHANGE UP (ref 3.5–5.3)
POTASSIUM SERPL-SCNC: 3.2 MMOL/L — LOW (ref 3.5–5.3)
POTASSIUM SERPL-SCNC: 4.1 MMOL/L — SIGNIFICANT CHANGE UP (ref 3.5–5.3)
SODIUM SERPL-SCNC: 140 MMOL/L — SIGNIFICANT CHANGE UP (ref 135–145)
SODIUM SERPL-SCNC: 141 MMOL/L — SIGNIFICANT CHANGE UP (ref 135–145)

## 2017-05-02 RX ORDER — ZALEPLON 10 MG
5 CAPSULE ORAL ONCE
Qty: 0 | Refills: 0 | Status: DISCONTINUED | OUTPATIENT
Start: 2017-05-02 | End: 2017-05-02

## 2017-05-02 RX ORDER — POTASSIUM CHLORIDE 20 MEQ
40 PACKET (EA) ORAL EVERY 4 HOURS
Qty: 0 | Refills: 0 | Status: COMPLETED | OUTPATIENT
Start: 2017-05-02 | End: 2017-05-02

## 2017-05-02 RX ADMIN — Medication 2 MILLIGRAM(S): at 18:09

## 2017-05-02 RX ADMIN — Medication 2 MILLIGRAM(S): at 23:06

## 2017-05-02 RX ADMIN — Medication 40 MILLIEQUIVALENT(S): at 09:16

## 2017-05-02 RX ADMIN — Medication 2 MILLIGRAM(S): at 12:28

## 2017-05-02 RX ADMIN — DEXTROSE MONOHYDRATE, SODIUM CHLORIDE, AND POTASSIUM CHLORIDE 100 MILLILITER(S): 50; .745; 4.5 INJECTION, SOLUTION INTRAVENOUS at 05:48

## 2017-05-02 RX ADMIN — Medication 40 MILLIEQUIVALENT(S): at 12:29

## 2017-05-02 RX ADMIN — ENOXAPARIN SODIUM 40 MILLIGRAM(S): 100 INJECTION SUBCUTANEOUS at 12:28

## 2017-05-02 RX ADMIN — PANTOPRAZOLE SODIUM 40 MILLIGRAM(S): 20 TABLET, DELAYED RELEASE ORAL at 05:48

## 2017-05-02 RX ADMIN — Medication 2 MILLIGRAM(S): at 00:41

## 2017-05-02 RX ADMIN — Medication 2 MILLIGRAM(S): at 05:48

## 2017-05-02 RX ADMIN — Medication 5 MILLIGRAM(S): at 23:06

## 2017-05-02 RX ADMIN — Medication 650 MILLIGRAM(S): at 23:05

## 2017-05-03 LAB
ALBUMIN SERPL ELPH-MCNC: 3.3 G/DL — LOW (ref 3.5–5)
ALP SERPL-CCNC: 56 U/L — SIGNIFICANT CHANGE UP (ref 40–120)
ALT FLD-CCNC: 78 U/L DA — HIGH (ref 10–60)
ANION GAP SERPL CALC-SCNC: 8 MMOL/L — SIGNIFICANT CHANGE UP (ref 5–17)
AST SERPL-CCNC: 61 U/L — HIGH (ref 10–40)
BILIRUB DIRECT SERPL-MCNC: 0.2 MG/DL — SIGNIFICANT CHANGE UP (ref 0–0.2)
BILIRUB INDIRECT FLD-MCNC: 0.4 MG/DL — SIGNIFICANT CHANGE UP (ref 0.2–1)
BILIRUB SERPL-MCNC: 0.6 MG/DL — SIGNIFICANT CHANGE UP (ref 0.2–1.2)
BUN SERPL-MCNC: 9 MG/DL — SIGNIFICANT CHANGE UP (ref 7–18)
CALCIUM SERPL-MCNC: 9.3 MG/DL — SIGNIFICANT CHANGE UP (ref 8.4–10.5)
CHLORIDE SERPL-SCNC: 104 MMOL/L — SIGNIFICANT CHANGE UP (ref 96–108)
CO2 SERPL-SCNC: 27 MMOL/L — SIGNIFICANT CHANGE UP (ref 22–31)
CREAT SERPL-MCNC: 0.78 MG/DL — SIGNIFICANT CHANGE UP (ref 0.5–1.3)
GLUCOSE SERPL-MCNC: 94 MG/DL — SIGNIFICANT CHANGE UP (ref 70–99)
HBV SURFACE AG SER-ACNC: SIGNIFICANT CHANGE UP
HCV AB S/CO SERPL IA: 12.04 S/CO — SIGNIFICANT CHANGE UP
HCV AB SERPL-IMP: REACTIVE
HCV RNA FLD QL NAA+PROBE: SIGNIFICANT CHANGE UP
HCV RNA SPEC QL PROBE+SIG AMP: DETECTED
HIV 1+2 AB+HIV1 P24 AG SERPL QL IA: SIGNIFICANT CHANGE UP
MAGNESIUM SERPL-MCNC: 2.2 MG/DL — SIGNIFICANT CHANGE UP (ref 1.8–2.4)
PHOSPHATE SERPL-MCNC: 3.5 MG/DL — SIGNIFICANT CHANGE UP (ref 2.5–4.5)
POTASSIUM SERPL-MCNC: 3.6 MMOL/L — SIGNIFICANT CHANGE UP (ref 3.5–5.3)
POTASSIUM SERPL-SCNC: 3.6 MMOL/L — SIGNIFICANT CHANGE UP (ref 3.5–5.3)
PROT SERPL-MCNC: 7.3 G/DL — SIGNIFICANT CHANGE UP (ref 6–8.3)
SODIUM SERPL-SCNC: 139 MMOL/L — SIGNIFICANT CHANGE UP (ref 135–145)

## 2017-05-03 PROCEDURE — 76700 US EXAM ABDOM COMPLETE: CPT | Mod: 26

## 2017-05-03 RX ORDER — TRAMADOL HYDROCHLORIDE 50 MG/1
50 TABLET ORAL ONCE
Qty: 0 | Refills: 0 | Status: DISCONTINUED | OUTPATIENT
Start: 2017-05-03 | End: 2017-05-03

## 2017-05-03 RX ORDER — FOLIC ACID 0.8 MG
1 TABLET ORAL DAILY
Qty: 0 | Refills: 0 | Status: DISCONTINUED | OUTPATIENT
Start: 2017-05-03 | End: 2017-05-04

## 2017-05-03 RX ORDER — THIAMINE MONONITRATE (VIT B1) 100 MG
100 TABLET ORAL DAILY
Qty: 0 | Refills: 0 | Status: DISCONTINUED | OUTPATIENT
Start: 2017-05-03 | End: 2017-05-04

## 2017-05-03 RX ADMIN — Medication 1 MILLIGRAM(S): at 12:31

## 2017-05-03 RX ADMIN — Medication 100 MILLIGRAM(S): at 12:31

## 2017-05-03 RX ADMIN — TRAMADOL HYDROCHLORIDE 50 MILLIGRAM(S): 50 TABLET ORAL at 20:30

## 2017-05-03 RX ADMIN — Medication 2 MILLIGRAM(S): at 12:31

## 2017-05-03 RX ADMIN — TRAMADOL HYDROCHLORIDE 50 MILLIGRAM(S): 50 TABLET ORAL at 03:34

## 2017-05-03 RX ADMIN — ENOXAPARIN SODIUM 40 MILLIGRAM(S): 100 INJECTION SUBCUTANEOUS at 12:31

## 2017-05-03 RX ADMIN — PANTOPRAZOLE SODIUM 40 MILLIGRAM(S): 20 TABLET, DELAYED RELEASE ORAL at 06:02

## 2017-05-03 RX ADMIN — Medication 2 MILLIGRAM(S): at 06:02

## 2017-05-03 RX ADMIN — TRAMADOL HYDROCHLORIDE 50 MILLIGRAM(S): 50 TABLET ORAL at 04:31

## 2017-05-03 RX ADMIN — Medication 650 MILLIGRAM(S): at 00:08

## 2017-05-03 RX ADMIN — Medication 1 MILLIGRAM(S): at 23:45

## 2017-05-03 RX ADMIN — TRAMADOL HYDROCHLORIDE 50 MILLIGRAM(S): 50 TABLET ORAL at 19:25

## 2017-05-03 RX ADMIN — Medication 1 TABLET(S): at 12:31

## 2017-05-04 VITALS
SYSTOLIC BLOOD PRESSURE: 144 MMHG | TEMPERATURE: 98 F | OXYGEN SATURATION: 100 % | HEART RATE: 74 BPM | RESPIRATION RATE: 16 BRPM | DIASTOLIC BLOOD PRESSURE: 88 MMHG

## 2017-05-04 LAB
ALBUMIN SERPL ELPH-MCNC: 3.2 G/DL — LOW (ref 3.5–5)
ALP SERPL-CCNC: 56 U/L — SIGNIFICANT CHANGE UP (ref 40–120)
ALT FLD-CCNC: 72 U/L DA — HIGH (ref 10–60)
ANION GAP SERPL CALC-SCNC: 6 MMOL/L — SIGNIFICANT CHANGE UP (ref 5–17)
AST SERPL-CCNC: 55 U/L — HIGH (ref 10–40)
BILIRUB DIRECT SERPL-MCNC: 0.2 MG/DL — SIGNIFICANT CHANGE UP (ref 0–0.2)
BILIRUB INDIRECT FLD-MCNC: 0.3 MG/DL — SIGNIFICANT CHANGE UP (ref 0.2–1)
BILIRUB SERPL-MCNC: 0.5 MG/DL — SIGNIFICANT CHANGE UP (ref 0.2–1.2)
BUN SERPL-MCNC: 10 MG/DL — SIGNIFICANT CHANGE UP (ref 7–18)
CALCIUM SERPL-MCNC: 9.3 MG/DL — SIGNIFICANT CHANGE UP (ref 8.4–10.5)
CHLORIDE SERPL-SCNC: 102 MMOL/L — SIGNIFICANT CHANGE UP (ref 96–108)
CO2 SERPL-SCNC: 31 MMOL/L — SIGNIFICANT CHANGE UP (ref 22–31)
CREAT SERPL-MCNC: 0.73 MG/DL — SIGNIFICANT CHANGE UP (ref 0.5–1.3)
CULTURE RESULTS: SIGNIFICANT CHANGE UP
GLUCOSE SERPL-MCNC: 87 MG/DL — SIGNIFICANT CHANGE UP (ref 70–99)
HCT VFR BLD CALC: 43.2 % — SIGNIFICANT CHANGE UP (ref 39–50)
HGB BLD-MCNC: 14.4 G/DL — SIGNIFICANT CHANGE UP (ref 13–17)
MAGNESIUM SERPL-MCNC: 2.2 MG/DL — SIGNIFICANT CHANGE UP (ref 1.8–2.4)
MCHC RBC-ENTMCNC: 33.1 PG — SIGNIFICANT CHANGE UP (ref 27–34)
MCHC RBC-ENTMCNC: 33.4 GM/DL — SIGNIFICANT CHANGE UP (ref 32–36)
MCV RBC AUTO: 99.1 FL — SIGNIFICANT CHANGE UP (ref 80–100)
PLATELET # BLD AUTO: 238 K/UL — SIGNIFICANT CHANGE UP (ref 150–400)
POTASSIUM SERPL-MCNC: 3.3 MMOL/L — LOW (ref 3.5–5.3)
POTASSIUM SERPL-SCNC: 3.3 MMOL/L — LOW (ref 3.5–5.3)
PROT SERPL-MCNC: 7.5 G/DL — SIGNIFICANT CHANGE UP (ref 6–8.3)
RBC # BLD: 4.36 M/UL — SIGNIFICANT CHANGE UP (ref 4.2–5.8)
RBC # FLD: 14.6 % — HIGH (ref 10.3–14.5)
SODIUM SERPL-SCNC: 139 MMOL/L — SIGNIFICANT CHANGE UP (ref 135–145)
SPECIMEN SOURCE: SIGNIFICANT CHANGE UP
WBC # BLD: 6.1 K/UL — SIGNIFICANT CHANGE UP (ref 3.8–10.5)
WBC # FLD AUTO: 6.1 K/UL — SIGNIFICANT CHANGE UP (ref 3.8–10.5)

## 2017-05-04 PROCEDURE — 70450 CT HEAD/BRAIN W/O DYE: CPT

## 2017-05-04 PROCEDURE — 84100 ASSAY OF PHOSPHORUS: CPT

## 2017-05-04 PROCEDURE — 81001 URINALYSIS AUTO W/SCOPE: CPT

## 2017-05-04 PROCEDURE — 80307 DRUG TEST PRSMV CHEM ANLYZR: CPT

## 2017-05-04 PROCEDURE — 71045 X-RAY EXAM CHEST 1 VIEW: CPT

## 2017-05-04 PROCEDURE — 72100 X-RAY EXAM L-S SPINE 2/3 VWS: CPT

## 2017-05-04 PROCEDURE — 82009 KETONE BODYS QUAL: CPT

## 2017-05-04 PROCEDURE — 82306 VITAMIN D 25 HYDROXY: CPT

## 2017-05-04 PROCEDURE — 96374 THER/PROPH/DIAG INJ IV PUSH: CPT

## 2017-05-04 PROCEDURE — 82550 ASSAY OF CK (CPK): CPT

## 2017-05-04 PROCEDURE — 85730 THROMBOPLASTIN TIME PARTIAL: CPT

## 2017-05-04 PROCEDURE — 80053 COMPREHEN METABOLIC PANEL: CPT

## 2017-05-04 PROCEDURE — 99291 CRITICAL CARE FIRST HOUR: CPT | Mod: 25

## 2017-05-04 PROCEDURE — 83735 ASSAY OF MAGNESIUM: CPT

## 2017-05-04 PROCEDURE — 82150 ASSAY OF AMYLASE: CPT

## 2017-05-04 PROCEDURE — 87340 HEPATITIS B SURFACE AG IA: CPT

## 2017-05-04 PROCEDURE — 83690 ASSAY OF LIPASE: CPT

## 2017-05-04 PROCEDURE — 87521 HEPATITIS C PROBE&RVRS TRNSC: CPT

## 2017-05-04 PROCEDURE — 83605 ASSAY OF LACTIC ACID: CPT

## 2017-05-04 PROCEDURE — 84484 ASSAY OF TROPONIN QUANT: CPT

## 2017-05-04 PROCEDURE — 87040 BLOOD CULTURE FOR BACTERIA: CPT

## 2017-05-04 PROCEDURE — 84443 ASSAY THYROID STIM HORMONE: CPT

## 2017-05-04 PROCEDURE — 82553 CREATINE MB FRACTION: CPT

## 2017-05-04 PROCEDURE — 87389 HIV-1 AG W/HIV-1&-2 AB AG IA: CPT

## 2017-05-04 PROCEDURE — 72040 X-RAY EXAM NECK SPINE 2-3 VW: CPT

## 2017-05-04 PROCEDURE — 82746 ASSAY OF FOLIC ACID SERUM: CPT

## 2017-05-04 PROCEDURE — 80048 BASIC METABOLIC PNL TOTAL CA: CPT

## 2017-05-04 PROCEDURE — 83880 ASSAY OF NATRIURETIC PEPTIDE: CPT

## 2017-05-04 PROCEDURE — 80076 HEPATIC FUNCTION PANEL: CPT

## 2017-05-04 PROCEDURE — 93005 ELECTROCARDIOGRAM TRACING: CPT

## 2017-05-04 PROCEDURE — 82248 BILIRUBIN DIRECT: CPT

## 2017-05-04 PROCEDURE — 83036 HEMOGLOBIN GLYCOSYLATED A1C: CPT

## 2017-05-04 PROCEDURE — 96375 TX/PRO/DX INJ NEW DRUG ADDON: CPT

## 2017-05-04 PROCEDURE — 82607 VITAMIN B-12: CPT

## 2017-05-04 PROCEDURE — 85610 PROTHROMBIN TIME: CPT

## 2017-05-04 PROCEDURE — 76700 US EXAM ABDOM COMPLETE: CPT

## 2017-05-04 PROCEDURE — 85027 COMPLETE CBC AUTOMATED: CPT

## 2017-05-04 PROCEDURE — 86803 HEPATITIS C AB TEST: CPT

## 2017-05-04 PROCEDURE — 80061 LIPID PANEL: CPT

## 2017-05-04 RX ORDER — THIAMINE MONONITRATE (VIT B1) 100 MG
1 TABLET ORAL
Qty: 30 | Refills: 0 | OUTPATIENT
Start: 2017-05-04 | End: 2017-06-03

## 2017-05-04 RX ORDER — FOLIC ACID 0.8 MG
1 TABLET ORAL
Qty: 30 | Refills: 0 | OUTPATIENT
Start: 2017-05-04 | End: 2017-06-03

## 2017-05-04 RX ORDER — POTASSIUM CHLORIDE 20 MEQ
40 PACKET (EA) ORAL ONCE
Qty: 0 | Refills: 0 | Status: COMPLETED | OUTPATIENT
Start: 2017-05-04 | End: 2017-05-04

## 2017-05-04 RX ADMIN — Medication 40 MILLIEQUIVALENT(S): at 10:12

## 2017-05-04 RX ADMIN — Medication 100 MILLIGRAM(S): at 12:18

## 2017-05-04 RX ADMIN — Medication 1 MILLIGRAM(S): at 06:40

## 2017-05-04 RX ADMIN — Medication 0.5 MILLIGRAM(S): at 15:10

## 2017-05-04 RX ADMIN — PANTOPRAZOLE SODIUM 40 MILLIGRAM(S): 20 TABLET, DELAYED RELEASE ORAL at 06:40

## 2017-05-04 RX ADMIN — Medication 1 TABLET(S): at 12:18

## 2017-05-04 RX ADMIN — ENOXAPARIN SODIUM 40 MILLIGRAM(S): 100 INJECTION SUBCUTANEOUS at 12:17

## 2017-05-04 RX ADMIN — Medication 1 MILLIGRAM(S): at 12:18

## 2017-05-06 LAB
CULTURE RESULTS: SIGNIFICANT CHANGE UP
CULTURE RESULTS: SIGNIFICANT CHANGE UP
SPECIMEN SOURCE: SIGNIFICANT CHANGE UP
SPECIMEN SOURCE: SIGNIFICANT CHANGE UP

## 2017-05-09 DIAGNOSIS — F17.210 NICOTINE DEPENDENCE, CIGARETTES, UNCOMPLICATED: ICD-10-CM

## 2017-05-09 DIAGNOSIS — M54.9 DORSALGIA, UNSPECIFIED: ICD-10-CM

## 2017-05-09 DIAGNOSIS — F32.9 MAJOR DEPRESSIVE DISORDER, SINGLE EPISODE, UNSPECIFIED: ICD-10-CM

## 2017-05-09 DIAGNOSIS — E86.0 DEHYDRATION: ICD-10-CM

## 2017-05-09 DIAGNOSIS — F10.239 ALCOHOL DEPENDENCE WITH WITHDRAWAL, UNSPECIFIED: ICD-10-CM

## 2017-05-09 DIAGNOSIS — Z59.0 HOMELESSNESS: ICD-10-CM
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
## 2017-05-09 DIAGNOSIS — F12.10 CANNABIS ABUSE, UNCOMPLICATED: ICD-10-CM

## 2017-05-09 DIAGNOSIS — R94.5 ABNORMAL RESULTS OF LIVER FUNCTION STUDIES: ICD-10-CM

## 2017-05-09 DIAGNOSIS — E83.39 OTHER DISORDERS OF PHOSPHORUS METABOLISM: ICD-10-CM

## 2017-05-09 DIAGNOSIS — B19.20 UNSPECIFIED VIRAL HEPATITIS C WITHOUT HEPATIC COMA: ICD-10-CM

## 2017-05-09 SDOH — ECONOMIC STABILITY - HOUSING INSECURITY: HOMELESSNESS: Z59.0

## 2017-05-10 DIAGNOSIS — Z91.81 HISTORY OF FALLING: ICD-10-CM
